# Patient Record
Sex: FEMALE | Race: WHITE
[De-identification: names, ages, dates, MRNs, and addresses within clinical notes are randomized per-mention and may not be internally consistent; named-entity substitution may affect disease eponyms.]

---

## 2017-02-06 ENCOUNTER — HOSPITAL ENCOUNTER (OUTPATIENT)
Dept: HOSPITAL 45 - C.MAMM | Age: 47
Discharge: HOME | End: 2017-02-06
Attending: INTERNAL MEDICINE
Payer: COMMERCIAL

## 2017-02-06 DIAGNOSIS — N63: ICD-10-CM

## 2017-02-06 DIAGNOSIS — Z12.31: Primary | ICD-10-CM

## 2017-02-06 NOTE — MAMMOGRAPHY REPORT
BILATERAL DIGITAL SCREENING MAMMOGRAM TOMOSYNTHESIS WITH CAD: 2/6/2017

CLINICAL HISTORY: Routine screening.  Patient has no complaints.  





TECHNIQUE:  Breast tomosynthesis in addition to standard 2D mammography was performed. Current study
 was also evaluated with a Computer Aided Detection (CAD) system.  



COMPARISON: Comparison is made to exams dated:  2/2/2016 mammogram, 1/28/2014 mammogram, 9/1/2015 ma
mmogram, 1/29/2015 mammogram, 6/29/2011 mammogram, and 9/19/2006 mammogram - [facility]
enter.   



BREAST COMPOSITION:  The tissue of both breasts is extremely dense, which lowers the sensitivity of 
mammography.  



FINDINGS:  There are 3 partially circumscribed masses in the superior left breast, 2 of which projec
t in the upper outer quadrant, measuring 7, 13 and 19 mm.  Although they could represent cysts, they
 are new/increased compared to prior exams.  Additional characterization with ultrasound and possibl
e additional mammographic views are recommended.



There are diffuse bilateral benign-appearing round, punctate and rim calcifications in the breasts. 
 No other suspicious mass, architectural distortion or cluster of microcalcifications is seen.  



IMPRESSION:  ACR BI-RADS CATEGORY 0: INCOMPLETE EVALUATION:  NEED ADDITIONAL IMAGING EVALUATION

The 3 partially circumscribed masses in the superior left breast need additional evaluation.  

The patient will be called to schedule an appointment.  





Approximately 10% of breast cancers are not detected with mammography. A negative mammographic repor
t should not delay biopsy if a clinically suggestive mass is present.



Ping Maria M.D.          

ay/:2/6/2017 15:42:01  



Imaging Technologist: Tonya URIOSTEGUI(ADRIEN)(KINGA), [facility]

letter sent: Addl Imaging 0  

BI-RADS Code: ACR BI-RADS Category 0: Incomplete Evaluation:  Need Additional Imaging Evaluation

## 2017-02-21 ENCOUNTER — HOSPITAL ENCOUNTER (OUTPATIENT)
Dept: HOSPITAL 45 - C.MAMM | Age: 47
Discharge: HOME | End: 2017-02-21
Attending: INTERNAL MEDICINE
Payer: COMMERCIAL

## 2017-02-21 DIAGNOSIS — N63: Primary | ICD-10-CM

## 2017-02-21 NOTE — MAMMOGRAPHY REPORT
ULTRASOUND OF LEFT BREAST: 2/21/2017

CLINICAL HISTORY: 46-year-old woman with a family history of breast cancer = mother and paternal gra
ndmother, called back from screening mammography for circumscribed masses within the left breast.  





COMPARISON: Comparison is made to exams dated:  2/6/2017 mammogram, 2/2/2016 mammogram, 1/29/2015 ma
mmogram, and 7/30/2014 mammogram - [facility].   



FINDINGS:  Real-time high-resolution ultrasound was performed in the superior left breast to evaluat
e for the circumscribed masses seen on screening mammography.  Multiple cysts and a benign calcifica
tion are identified.  In the 1:00 axis, 47 m from the nipple, there is an oval parallel circumscribe
d anechoic cyst measuring 5.5 x 3.5 x 6.0 mm.  A slightly larger anechoic simple cyst is identified 
in the 1:00 left breast, 3 cm from the nipple, measuring 11.1 x 7.7 x 11.3 mm.  A third oval paralle
l circumscribed cyst is identified in the 1:00 left breast, 2 cm from the nipple, measuring 16.9 x 9
.3 x 16.7 mm.  Curvilinear echogenic shadowing 2 x 3 mm mass is identified in the 12:00 left breast,
 4 cm from the nipple, correlating with the benign rim calcification seen mammographically.  No susp
icious solid mass is identified.  Other scattered subcentimeter benign anechoic cysts are seen in th
e 12:00 and 1:00 axes.



IMPRESSION: ACR BI-RADS CATEGORY 2: BENIGN 

1.  At least 3 circumscribed masses in the left breast seen mammographically correlate with multiple
 anechoic cysts in the superior left breast on ultrasound.  These findings are compatible with benig
n fibrocystic changes.

2.  Would recommend follow-up in 1 year for next annual screening mammogram.  Also given the extreme
ly dense breasts and family history of breast cancer, consider additional supplemental screening wit
h whole breast ultrasound or breast MRI, if the patient's lifetime risk of developing breast cancer 
is at least 20%.



These results and recommendations were discussed with the patient at the time of the exam.



Ping Maria M.D.  

ay/:2/21/2017 08:33:39  



Imaging Technologist: Aidee URIOSTEGUI(R)(M), [facility]

letter sent: Normal 1/2  

BI-RADS Code: ACR BI-RADS Category 2: Benign

## 2017-04-06 ENCOUNTER — HOSPITAL ENCOUNTER (OUTPATIENT)
Dept: HOSPITAL 45 - C.PAPS | Age: 47
Discharge: HOME | End: 2017-04-06
Attending: OBSTETRICS & GYNECOLOGY
Payer: COMMERCIAL

## 2017-04-06 DIAGNOSIS — Z01.419: Primary | ICD-10-CM

## 2017-05-20 ENCOUNTER — HOSPITAL ENCOUNTER (OUTPATIENT)
Dept: HOSPITAL 45 - C.LABBC | Age: 47
Discharge: HOME | End: 2017-05-20
Attending: INTERNAL MEDICINE
Payer: COMMERCIAL

## 2017-05-20 DIAGNOSIS — E78.00: Primary | ICD-10-CM

## 2017-05-20 DIAGNOSIS — F41.8: ICD-10-CM

## 2017-05-20 LAB
ALBUMIN/GLOB SERPL: 1.3 {RATIO} (ref 0.9–2)
ALP SERPL-CCNC: 50 U/L (ref 45–117)
ALT SERPL-CCNC: 20 U/L (ref 12–78)
ANION GAP SERPL CALC-SCNC: 4 MMOL/L (ref 3–11)
AST SERPL-CCNC: 13 U/L (ref 15–37)
BUN SERPL-MCNC: 11 MG/DL (ref 7–18)
BUN/CREAT SERPL: 15.5 (ref 10–20)
CALCIUM SERPL-MCNC: 8.2 MG/DL (ref 8.5–10.1)
CHLORIDE SERPL-SCNC: 109 MMOL/L (ref 98–107)
CHOLEST/HDLC SERPL: 3.4 {RATIO}
CO2 SERPL-SCNC: 31 MMOL/L (ref 21–32)
CREAT SERPL-MCNC: 0.72 MG/DL (ref 0.6–1.2)
GLOBULIN SER-MCNC: 3.1 GM/DL (ref 2.5–4)
GLUCOSE SERPL-MCNC: 88 MG/DL (ref 70–99)
GLUCOSE UR QL: 52 MG/DL
KETONES UR QL STRIP: 108 MG/DL
NITRITE UR QL STRIP: 94 MG/DL (ref 0–150)
PH UR: 179 MG/DL (ref 0–200)
POTASSIUM SERPL-SCNC: 4 MMOL/L (ref 3.5–5.1)
SODIUM SERPL-SCNC: 144 MMOL/L (ref 136–145)
TSH SERPL-ACNC: 2.48 UIU/ML (ref 0.3–4.5)
VERY LOW DENSITY LIPOPROT CALC: 19 MG/DL

## 2018-02-08 ENCOUNTER — HOSPITAL ENCOUNTER (OUTPATIENT)
Dept: HOSPITAL 45 - C.MAMM | Age: 48
Discharge: HOME | End: 2018-02-08
Attending: INTERNAL MEDICINE
Payer: COMMERCIAL

## 2018-02-08 DIAGNOSIS — Z12.31: Primary | ICD-10-CM

## 2018-02-08 NOTE — MAMMOGRAPHY REPORT
BILATERAL DIGITAL SCREENING MAMMOGRAM TOMOSYNTHESIS WITH CAD: 2/8/2018

CLINICAL HISTORY: Routine screening.  Patient has no complaints.  





TECHNIQUE:  Breast tomosynthesis in addition to standard 2D mammography was performed. Current study 
was also evaluated with a Computer Aided Detection (CAD) system.  



COMPARISON: Comparison is made to exams dated:  2/21/2017 ultrasound, 2/6/2017 mammogram, 2/2/2016 ma
mmogram, 9/1/2015 mammogram, 1/29/2015 mammogram, and 1/28/2014 mammogram - [facility].   



BREAST COMPOSITION:  The tissue of both breasts is extremely dense, which lowers the sensitivity of m
ammography.  



FINDINGS:  No suspicious masses, calcifications, or areas of architectural distortion are noted in ei
ther breast. There has been no significant interval change compared to prior exams. Scattered bilater
al benign-appearing calcifications are not significantly changed.  A few round/oval circumscribed marcello
ign-appearing masses are noted bilaterally, most prominent in the left upper outer quadrant, with mul
tiple benign cysts seen on prior ultrasound exams.  Asymmetries seen within bilateral superior poster
ior breasts overlying the pectoralis muscles are stable compared to prior exams.





IMPRESSION:  ACR BI-RADS CATEGORY 2: BENIGN

There is no mammographic evidence of malignancy. A 1 year screening mammogram is recommended.  The pa
tient will receive written notification of the results.  





Approximately 10% of breast cancers are not detected with mammography. A negative mammographic report
 should not delay biopsy if a clinically suggestive mass is present.



Aliyah Manuel M.D.          

ah/:2/8/2018 08:27:41  



Imaging Technologist: Carina URIOSTEGUI(ADRIEN)(M), [facility]

letter sent: Normal 1/2  

BI-RADS Code: ACR BI-RADS Category 2: Benign